# Patient Record
Sex: FEMALE | Race: WHITE | NOT HISPANIC OR LATINO | Employment: OTHER | URBAN - METROPOLITAN AREA
[De-identification: names, ages, dates, MRNs, and addresses within clinical notes are randomized per-mention and may not be internally consistent; named-entity substitution may affect disease eponyms.]

---

## 2018-01-31 ENCOUNTER — OFFICE VISIT (OUTPATIENT)
Dept: RHEUMATOLOGY | Facility: CLINIC | Age: 48
End: 2018-01-31
Payer: MEDICARE

## 2018-01-31 ENCOUNTER — LAB VISIT (OUTPATIENT)
Dept: LAB | Facility: HOSPITAL | Age: 48
End: 2018-01-31
Attending: INTERNAL MEDICINE
Payer: MEDICARE

## 2018-01-31 VITALS
SYSTOLIC BLOOD PRESSURE: 125 MMHG | WEIGHT: 184 LBS | BODY MASS INDEX: 32.6 KG/M2 | HEIGHT: 63 IN | HEART RATE: 128 BPM | DIASTOLIC BLOOD PRESSURE: 86 MMHG

## 2018-01-31 DIAGNOSIS — R21 FACIAL RASH: ICD-10-CM

## 2018-01-31 DIAGNOSIS — R77.8 LOW SERUM COMPLEMENT C3: ICD-10-CM

## 2018-01-31 DIAGNOSIS — R77.8 LOW SERUM COMPLEMENT C3: Primary | ICD-10-CM

## 2018-01-31 DIAGNOSIS — M79.7 FIBROMYALGIA: ICD-10-CM

## 2018-01-31 LAB
C3 SERPL-MCNC: 141 MG/DL
C4 SERPL-MCNC: 24 MG/DL

## 2018-01-31 PROCEDURE — 82595 ASSAY OF CRYOGLOBULIN: CPT

## 2018-01-31 PROCEDURE — 99204 OFFICE O/P NEW MOD 45 MIN: CPT | Mod: S$PBB,,, | Performed by: INTERNAL MEDICINE

## 2018-01-31 PROCEDURE — 99999 PR PBB SHADOW E&M-NEW PATIENT-LVL III: CPT | Mod: PBBFAC,,, | Performed by: INTERNAL MEDICINE

## 2018-01-31 PROCEDURE — 86160 COMPLEMENT ANTIGEN: CPT

## 2018-01-31 PROCEDURE — 86162 COMPLEMENT TOTAL (CH50): CPT

## 2018-01-31 PROCEDURE — 86160 COMPLEMENT ANTIGEN: CPT | Mod: 59

## 2018-01-31 PROCEDURE — 36415 COLL VENOUS BLD VENIPUNCTURE: CPT

## 2018-01-31 PROCEDURE — 99203 OFFICE O/P NEW LOW 30 MIN: CPT | Mod: PBBFAC | Performed by: INTERNAL MEDICINE

## 2018-01-31 RX ORDER — TIZANIDINE 4 MG/1
TABLET ORAL
Refills: 0 | COMMUNITY
Start: 2017-11-28

## 2018-01-31 RX ORDER — ESTRADIOL 0.1 MG/D
FILM, EXTENDED RELEASE TRANSDERMAL
Refills: 4 | COMMUNITY
Start: 2018-01-05

## 2018-01-31 RX ORDER — DICYCLOMINE HYDROCHLORIDE 10 MG/1
CAPSULE ORAL
Refills: 3 | COMMUNITY
Start: 2018-01-05

## 2018-01-31 RX ORDER — CETIRIZINE HYDROCHLORIDE 10 MG/1
10 TABLET ORAL DAILY
COMMUNITY

## 2018-01-31 RX ORDER — SERTRALINE HYDROCHLORIDE 100 MG/1
TABLET, FILM COATED ORAL
Refills: 2 | COMMUNITY
Start: 2017-11-09

## 2018-01-31 RX ORDER — ALPRAZOLAM 0.5 MG/1
TABLET ORAL
Refills: 2 | COMMUNITY
Start: 2018-01-08

## 2018-01-31 RX ORDER — BUTALBITAL, ACETAMINOPHEN AND CAFFEINE 50; 325; 40 MG/1; MG/1; MG/1
TABLET ORAL
Refills: 2 | COMMUNITY
Start: 2017-11-03

## 2018-01-31 RX ORDER — ZOLPIDEM TARTRATE 10 MG/1
TABLET ORAL
Refills: 2 | COMMUNITY
Start: 2018-01-05

## 2018-01-31 NOTE — PROGRESS NOTES
Chief Complaint   Patient presents with    Pain       Patient was referred by : self     History of presenting illness    Very pleasant 47 year old white female has fibromyalgia syndrome for 17 years  She has chronic widespread pain  TMJ pain  She doesn't sleep well,needs ambien 10 mg  She has IBS takes bentyl  She has migraine headaches  She takes tizanidine  In the past she took lyrica and gabapentin and they made her gain weight  She didn't like flexeril    Now she has the same pain but also shoulder,hip,neck,ankle and wrist arthralgias  She has no ROM issues in them  No swelling in the joints more than usual  Hands look puffy at times    In addition  Claims to have a rash on her face and arms  Red,itchy and hot  No triggers  Initially thought it was a rope at work,but subsequently doesn't know  No new meds,cosmetics,detergentsm,linen  No infections  They come as hives she says  Waiting to see dermatology and allergy  Zyrtec helps  Rash doesn't get worse with sun  Hot showers dont make it worse  No foods she can identify    Claims to have iron deficiency,needed iron transfusions  S/p hysterectomy  Normal colonoscopy  Not sure if absorption issues  Vit d deficient  Not sure if gluten sensitive    Panic attacks +  BP fluctuations+    Hair thins easy  Bruises easy    Has hypermobility syndrome,might have dislocated her jaw,fingers  Saw ,doesnt have the gene for ED syndrome    Recent labs for Ra,lupus negative  Low C3 only noted    Past history : fibromyalgia    Family history : none significant     Social history: not a smoker and alcoholic    Review of Systems   Constitutional: Negative for activity change, appetite change, chills, diaphoresis, fatigue, fever and unexpected weight change.   HENT: Negative for congestion, dental problem, drooling, ear discharge, ear pain, facial swelling, hearing loss, mouth sores, nosebleeds, postnasal drip, rhinorrhea, sinus pain, sinus pressure, sneezing, sore throat,  tinnitus, trouble swallowing and voice change.    Eyes: Negative for photophobia, pain, discharge, redness, itching and visual disturbance.   Respiratory: Negative for apnea, cough, choking, chest tightness, shortness of breath, wheezing and stridor.    Cardiovascular: Negative for chest pain, palpitations and leg swelling.   Gastrointestinal: Negative for abdominal distention, abdominal pain, anal bleeding, blood in stool, constipation, diarrhea, nausea, rectal pain and vomiting.   Endocrine: Negative for cold intolerance, heat intolerance, polydipsia, polyphagia and polyuria.   Genitourinary: Negative for decreased urine volume, difficulty urinating, dysuria, enuresis, flank pain, frequency, genital sores, hematuria and urgency.   Musculoskeletal: Negative for arthralgias, back pain, gait problem, joint swelling, myalgias, neck pain and neck stiffness.   Skin: Negative for color change, pallor, rash and wound.   Allergic/Immunologic: Negative for environmental allergies, food allergies and immunocompromised state.   Neurological: Negative for dizziness, tremors, seizures, syncope, facial asymmetry, speech difficulty, weakness, light-headedness, numbness and headaches.   Hematological: Negative for adenopathy. Does not bruise/bleed easily.   Psychiatric/Behavioral: Negative for agitation, behavioral problems, confusion, decreased concentration, dysphoric mood, hallucinations, self-injury, sleep disturbance and suicidal ideas. The patient is not nervous/anxious and is not hyperactive.      Physical Exam     ACEVEDO-28 tender joint count: 0  ACEVEDO-28 swollen joint count: 0    Physical Exam   Constitutional: She is oriented to person, place, and time and well-developed, well-nourished, and in no distress. No distress.   HENT:   Head: Normocephalic.   Mouth/Throat: Oropharynx is clear and moist.   Eyes: Conjunctivae are normal. Pupils are equal, round, and reactive to light. Right eye exhibits no discharge. Left eye exhibits  no discharge. No scleral icterus.   Neck: Normal range of motion. No thyromegaly present.   Cardiovascular: Normal rate, regular rhythm, normal heart sounds and intact distal pulses.    Pulmonary/Chest: Effort normal and breath sounds normal. No stridor.   Abdominal: Soft. Bowel sounds are normal.   Lymphadenopathy:     She has no cervical adenopathy.   Neurological: She is alert and oriented to person, place, and time.   Skin: Skin is warm. No rash noted. She is not diaphoretic.     Erythematous rash all over the face,looks like telangiectasias    Psychiatric: Affect and judgment normal.   Musculoskeletal: Normal range of motion.       No labs and xrays available    Assessment     47 year old white female comes in with :    Fibromyalgia syndrome for 17 years with recent pain in the shoulders,neck,hips,ankles and wrists    New onset rash on the face and arms : itchy like hives,warm    She has negative RA,DANIELA panel  She has low C3    Joint pains might be due to her fibromyalgia  She really has a normal joint exam today  Didn't appreciate any synovitis or range of motion issues    Rash looks like rosacea  She also mentions itchy and hives,makes me think of allergies  Wonder about her iron and vit d deficiencies,since there was no source for bleed,does she have malabsorption issues,like celiac? Which might explain the rash sensitivity to gluten?  Rash is not photosensitive  It doesn't come and go  DANIELA negative  Less likely lupus    1. Low serum complement C3    2. Fibromyalgia    3. Facial rash        Reviewed medications    New problem     Plan    -see dermatology and allergist  Make sure she has no triggers that she is not aware of  Rope ? Brought on the symptoms the first time    -talk to Gi,does she have celiac  Eliminate gluten from the diet and see if her symptoms get better    -discussed other treatment options for FMS :  Cymbalta/amitryptilline/nortryptilline/venlafaxine/desvenlafaxine/milnacaprin/muscle  relaxants  Gave her a list to talk to her rheumatologist    -importance of good sleep,deborah chi,yoga and water aerobics discussed    -rpt C3,C4,CH50,add ervin    Alfonso was seen today for pain.    Diagnoses and all orders for this visit:    Low serum complement C3  -     C3 complement; Standing  -     C4 complement; Standing  -     Complement, total; Future  -     Cryoglobulin; Future    Fibromyalgia    Facial rash      We will d/c her  Discuss labs on the phone    In the event she has a new diagnosis with dermatology we can always see her,she will call us    Notes will be sent to PCP if necessary

## 2018-02-02 LAB — CH50 SERPL-ACNC: 64 U/ML

## 2018-02-07 ENCOUNTER — PATIENT MESSAGE (OUTPATIENT)
Dept: RHEUMATOLOGY | Facility: CLINIC | Age: 48
End: 2018-02-07

## 2018-02-07 ENCOUNTER — DOCUMENTATION ONLY (OUTPATIENT)
Dept: RHEUMATOLOGY | Facility: CLINIC | Age: 48
End: 2018-02-07

## 2018-02-09 LAB — CRYOGLOB SER QL: NORMAL
